# Patient Record
Sex: MALE | Race: WHITE | NOT HISPANIC OR LATINO | Employment: OTHER | URBAN - METROPOLITAN AREA
[De-identification: names, ages, dates, MRNs, and addresses within clinical notes are randomized per-mention and may not be internally consistent; named-entity substitution may affect disease eponyms.]

---

## 2020-09-09 ENCOUNTER — RECORDS - HEALTHEAST (OUTPATIENT)
Dept: LAB | Facility: CLINIC | Age: 85
End: 2020-09-09

## 2020-09-14 LAB
SARS-COV-2 BY NAA - HISTORICAL: NOT DETECTED
SARS-COV-2 SOURCE - HISTORICAL: NORMAL

## 2021-02-11 ENCOUNTER — RECORDS - HEALTHEAST (OUTPATIENT)
Dept: LAB | Facility: CLINIC | Age: 86
End: 2021-02-11

## 2021-02-11 LAB
SARS-COV-2 PCR COMMENT: NORMAL
SARS-COV-2 RNA SPEC QL NAA+PROBE: NEGATIVE
SARS-COV-2 VIRUS SPECIMEN SOURCE: NORMAL

## 2021-02-18 ENCOUNTER — RECORDS - HEALTHEAST (OUTPATIENT)
Dept: LAB | Facility: CLINIC | Age: 86
End: 2021-02-18

## 2021-03-11 ENCOUNTER — RECORDS - HEALTHEAST (OUTPATIENT)
Dept: LAB | Facility: CLINIC | Age: 86
End: 2021-03-11

## 2021-04-07 ENCOUNTER — RECORDS - HEALTHEAST (OUTPATIENT)
Dept: LAB | Facility: CLINIC | Age: 86
End: 2021-04-07

## 2021-04-13 ENCOUNTER — RECORDS - HEALTHEAST (OUTPATIENT)
Dept: LAB | Facility: CLINIC | Age: 86
End: 2021-04-13

## 2021-08-18 ENCOUNTER — LAB REQUISITION (OUTPATIENT)
Dept: LAB | Facility: CLINIC | Age: 86
End: 2021-08-18
Payer: MEDICARE

## 2021-08-18 DIAGNOSIS — U07.1 COVID-19: ICD-10-CM

## 2021-08-19 PROCEDURE — U0003 INFECTIOUS AGENT DETECTION BY NUCLEIC ACID (DNA OR RNA); SEVERE ACUTE RESPIRATORY SYNDROME CORONAVIRUS 2 (SARS-COV-2) (CORONAVIRUS DISEASE [COVID-19]), AMPLIFIED PROBE TECHNIQUE, MAKING USE OF HIGH THROUGHPUT TECHNOLOGIES AS DESCRIBED BY CMS-2020-01-R: HCPCS | Mod: ORL | Performed by: FAMILY MEDICINE

## 2021-08-20 LAB — SARS-COV-2 RNA RESP QL NAA+PROBE: NEGATIVE

## 2021-08-23 ENCOUNTER — LAB REQUISITION (OUTPATIENT)
Dept: LAB | Facility: CLINIC | Age: 86
End: 2021-08-23
Payer: MEDICARE

## 2021-08-23 DIAGNOSIS — U07.1 COVID-19: ICD-10-CM

## 2021-08-25 PROCEDURE — U0003 INFECTIOUS AGENT DETECTION BY NUCLEIC ACID (DNA OR RNA); SEVERE ACUTE RESPIRATORY SYNDROME CORONAVIRUS 2 (SARS-COV-2) (CORONAVIRUS DISEASE [COVID-19]), AMPLIFIED PROBE TECHNIQUE, MAKING USE OF HIGH THROUGHPUT TECHNOLOGIES AS DESCRIBED BY CMS-2020-01-R: HCPCS | Mod: ORL | Performed by: FAMILY MEDICINE

## 2021-08-26 LAB — SARS-COV-2 RNA RESP QL NAA+PROBE: NEGATIVE

## 2021-08-30 ENCOUNTER — LAB REQUISITION (OUTPATIENT)
Dept: LAB | Facility: CLINIC | Age: 86
End: 2021-08-30
Payer: MEDICARE

## 2021-08-30 DIAGNOSIS — U07.1 COVID-19: ICD-10-CM

## 2021-08-30 PROCEDURE — U0003 INFECTIOUS AGENT DETECTION BY NUCLEIC ACID (DNA OR RNA); SEVERE ACUTE RESPIRATORY SYNDROME CORONAVIRUS 2 (SARS-COV-2) (CORONAVIRUS DISEASE [COVID-19]), AMPLIFIED PROBE TECHNIQUE, MAKING USE OF HIGH THROUGHPUT TECHNOLOGIES AS DESCRIBED BY CMS-2020-01-R: HCPCS | Mod: ORL | Performed by: FAMILY MEDICINE

## 2021-08-31 ENCOUNTER — LAB REQUISITION (OUTPATIENT)
Dept: LAB | Facility: CLINIC | Age: 86
End: 2021-08-31
Payer: MEDICARE

## 2021-08-31 DIAGNOSIS — U07.1 COVID-19: ICD-10-CM

## 2021-08-31 LAB — SARS-COV-2 RNA RESP QL NAA+PROBE: NEGATIVE

## 2021-09-02 ENCOUNTER — LAB REQUISITION (OUTPATIENT)
Dept: LAB | Facility: CLINIC | Age: 86
End: 2021-09-02
Payer: MEDICARE

## 2021-09-02 DIAGNOSIS — U07.1 COVID-19: ICD-10-CM

## 2021-09-09 PROCEDURE — U0005 INFEC AGEN DETEC AMPLI PROBE: HCPCS | Mod: ORL | Performed by: FAMILY MEDICINE

## 2021-09-10 LAB — SARS-COV-2 RNA RESP QL NAA+PROBE: NEGATIVE

## 2022-01-05 PROCEDURE — U0005 INFEC AGEN DETEC AMPLI PROBE: HCPCS | Mod: ORL | Performed by: INTERNAL MEDICINE

## 2022-01-06 ENCOUNTER — LAB REQUISITION (OUTPATIENT)
Dept: LAB | Facility: CLINIC | Age: 87
End: 2022-01-06
Payer: MEDICARE

## 2022-01-06 DIAGNOSIS — U07.1 COVID-19: ICD-10-CM

## 2022-01-06 LAB — SARS-COV-2 RNA RESP QL NAA+PROBE: NEGATIVE

## 2022-01-07 ENCOUNTER — LAB REQUISITION (OUTPATIENT)
Dept: LAB | Facility: CLINIC | Age: 87
End: 2022-01-07
Payer: MEDICARE

## 2022-01-07 DIAGNOSIS — U07.1 COVID-19: ICD-10-CM

## 2022-01-11 PROCEDURE — U0003 INFECTIOUS AGENT DETECTION BY NUCLEIC ACID (DNA OR RNA); SEVERE ACUTE RESPIRATORY SYNDROME CORONAVIRUS 2 (SARS-COV-2) (CORONAVIRUS DISEASE [COVID-19]), AMPLIFIED PROBE TECHNIQUE, MAKING USE OF HIGH THROUGHPUT TECHNOLOGIES AS DESCRIBED BY CMS-2020-01-R: HCPCS | Mod: ORL | Performed by: INTERNAL MEDICINE

## 2022-01-14 ENCOUNTER — LAB REQUISITION (OUTPATIENT)
Dept: LAB | Facility: CLINIC | Age: 87
End: 2022-01-14
Payer: MEDICARE

## 2022-01-14 DIAGNOSIS — U07.1 COVID-19: ICD-10-CM

## 2022-01-14 LAB — SARS-COV-2 RNA RESP QL NAA+PROBE: NEGATIVE

## 2022-01-20 PROCEDURE — U0003 INFECTIOUS AGENT DETECTION BY NUCLEIC ACID (DNA OR RNA); SEVERE ACUTE RESPIRATORY SYNDROME CORONAVIRUS 2 (SARS-COV-2) (CORONAVIRUS DISEASE [COVID-19]), AMPLIFIED PROBE TECHNIQUE, MAKING USE OF HIGH THROUGHPUT TECHNOLOGIES AS DESCRIBED BY CMS-2020-01-R: HCPCS | Mod: ORL | Performed by: INTERNAL MEDICINE

## 2022-01-23 LAB — SARS-COV-2 RNA RESP QL NAA+PROBE: NEGATIVE

## 2022-01-25 ENCOUNTER — LAB REQUISITION (OUTPATIENT)
Dept: LAB | Facility: CLINIC | Age: 87
End: 2022-01-25
Payer: MEDICARE

## 2022-01-25 DIAGNOSIS — U07.1 COVID-19: ICD-10-CM

## 2022-01-28 PROCEDURE — U0005 INFEC AGEN DETEC AMPLI PROBE: HCPCS | Mod: ORL | Performed by: INTERNAL MEDICINE

## 2022-01-29 LAB — SARS-COV-2 RNA RESP QL NAA+PROBE: NEGATIVE

## 2022-02-02 ENCOUNTER — LAB REQUISITION (OUTPATIENT)
Dept: LAB | Facility: CLINIC | Age: 87
End: 2022-02-02
Payer: MEDICARE

## 2022-02-02 DIAGNOSIS — U07.1 COVID-19: ICD-10-CM

## 2022-02-11 PROCEDURE — U0003 INFECTIOUS AGENT DETECTION BY NUCLEIC ACID (DNA OR RNA); SEVERE ACUTE RESPIRATORY SYNDROME CORONAVIRUS 2 (SARS-COV-2) (CORONAVIRUS DISEASE [COVID-19]), AMPLIFIED PROBE TECHNIQUE, MAKING USE OF HIGH THROUGHPUT TECHNOLOGIES AS DESCRIBED BY CMS-2020-01-R: HCPCS | Mod: ORL | Performed by: INTERNAL MEDICINE

## 2022-02-12 LAB — SARS-COV-2 RNA RESP QL NAA+PROBE: NEGATIVE

## 2022-02-17 ENCOUNTER — LAB REQUISITION (OUTPATIENT)
Dept: LAB | Facility: CLINIC | Age: 87
End: 2022-02-17
Payer: MEDICARE

## 2022-02-17 DIAGNOSIS — U07.1 COVID-19: ICD-10-CM

## 2022-02-18 PROCEDURE — U0003 INFECTIOUS AGENT DETECTION BY NUCLEIC ACID (DNA OR RNA); SEVERE ACUTE RESPIRATORY SYNDROME CORONAVIRUS 2 (SARS-COV-2) (CORONAVIRUS DISEASE [COVID-19]), AMPLIFIED PROBE TECHNIQUE, MAKING USE OF HIGH THROUGHPUT TECHNOLOGIES AS DESCRIBED BY CMS-2020-01-R: HCPCS | Mod: ORL | Performed by: INTERNAL MEDICINE

## 2022-02-19 LAB — SARS-COV-2 RNA RESP QL NAA+PROBE: NEGATIVE

## 2022-02-24 ENCOUNTER — LAB REQUISITION (OUTPATIENT)
Dept: LAB | Facility: CLINIC | Age: 87
End: 2022-02-24
Payer: MEDICARE

## 2022-02-24 DIAGNOSIS — U07.1 COVID-19: ICD-10-CM

## 2022-02-25 PROCEDURE — U0003 INFECTIOUS AGENT DETECTION BY NUCLEIC ACID (DNA OR RNA); SEVERE ACUTE RESPIRATORY SYNDROME CORONAVIRUS 2 (SARS-COV-2) (CORONAVIRUS DISEASE [COVID-19]), AMPLIFIED PROBE TECHNIQUE, MAKING USE OF HIGH THROUGHPUT TECHNOLOGIES AS DESCRIBED BY CMS-2020-01-R: HCPCS | Mod: ORL | Performed by: INTERNAL MEDICINE

## 2022-02-26 LAB — SARS-COV-2 RNA RESP QL NAA+PROBE: NEGATIVE

## 2022-06-23 PROCEDURE — U0005 INFEC AGEN DETEC AMPLI PROBE: HCPCS | Mod: ORL | Performed by: INTERNAL MEDICINE

## 2022-06-24 ENCOUNTER — LAB REQUISITION (OUTPATIENT)
Dept: LAB | Facility: CLINIC | Age: 87
End: 2022-06-24
Payer: MEDICARE

## 2022-06-24 DIAGNOSIS — U07.1 COVID-19: ICD-10-CM

## 2022-06-24 LAB — SARS-COV-2 RNA RESP QL NAA+PROBE: NEGATIVE

## 2022-08-05 ENCOUNTER — LAB REQUISITION (OUTPATIENT)
Dept: LAB | Facility: CLINIC | Age: 87
End: 2022-08-05
Payer: MEDICARE

## 2022-08-05 DIAGNOSIS — U07.1 COVID-19: ICD-10-CM

## 2022-08-05 PROCEDURE — U0005 INFEC AGEN DETEC AMPLI PROBE: HCPCS | Mod: ORL | Performed by: INTERNAL MEDICINE

## 2022-08-06 LAB — SARS-COV-2 RNA RESP QL NAA+PROBE: NEGATIVE

## 2022-09-09 ENCOUNTER — DOCUMENTATION ONLY (OUTPATIENT)
Dept: OTHER | Facility: CLINIC | Age: 87
End: 2022-09-09

## 2023-01-01 ENCOUNTER — DOCUMENTATION ONLY (OUTPATIENT)
Dept: OTHER | Facility: CLINIC | Age: 88
End: 2023-01-01
Payer: MEDICARE

## 2023-01-01 ENCOUNTER — LAB REQUISITION (OUTPATIENT)
Dept: LAB | Facility: CLINIC | Age: 88
End: 2023-01-01
Payer: MEDICARE

## 2023-01-01 ENCOUNTER — ASSISTED LIVING VISIT (OUTPATIENT)
Dept: GERIATRICS | Facility: CLINIC | Age: 88
End: 2023-01-01
Payer: MEDICARE

## 2023-01-01 ENCOUNTER — TELEPHONE (OUTPATIENT)
Dept: GERIATRICS | Facility: CLINIC | Age: 88
End: 2023-01-01
Payer: MEDICARE

## 2023-01-01 ENCOUNTER — DOCUMENTATION ONLY (OUTPATIENT)
Dept: GERIATRICS | Facility: CLINIC | Age: 88
End: 2023-01-01
Payer: MEDICARE

## 2023-01-01 VITALS
HEIGHT: 65 IN | DIASTOLIC BLOOD PRESSURE: 67 MMHG | HEART RATE: 71 BPM | RESPIRATION RATE: 18 BRPM | WEIGHT: 134.2 LBS | OXYGEN SATURATION: 98 % | BODY MASS INDEX: 22.36 KG/M2 | SYSTOLIC BLOOD PRESSURE: 130 MMHG | TEMPERATURE: 98 F

## 2023-01-01 VITALS
HEART RATE: 63 BPM | OXYGEN SATURATION: 97 % | HEIGHT: 65 IN | SYSTOLIC BLOOD PRESSURE: 147 MMHG | DIASTOLIC BLOOD PRESSURE: 54 MMHG | RESPIRATION RATE: 16 BRPM | TEMPERATURE: 97.4 F | WEIGHT: 134.2 LBS | BODY MASS INDEX: 22.36 KG/M2

## 2023-01-01 VITALS
BODY MASS INDEX: 22.36 KG/M2 | WEIGHT: 134.2 LBS | HEART RATE: 77 BPM | HEIGHT: 65 IN | RESPIRATION RATE: 19 BRPM | DIASTOLIC BLOOD PRESSURE: 82 MMHG | SYSTOLIC BLOOD PRESSURE: 143 MMHG | TEMPERATURE: 97.3 F | OXYGEN SATURATION: 96 %

## 2023-01-01 DIAGNOSIS — F02.C3 SEVERE LATE ONSET ALZHEIMER'S DEMENTIA WITH MOOD DISTURBANCE (H): ICD-10-CM

## 2023-01-01 DIAGNOSIS — G30.1 SEVERE LATE ONSET ALZHEIMER'S DEMENTIA WITH MOOD DISTURBANCE (H): Primary | ICD-10-CM

## 2023-01-01 DIAGNOSIS — F02.80 ALZHEIMER'S DISEASE (H): ICD-10-CM

## 2023-01-01 DIAGNOSIS — G30.9 ALZHEIMER'S DISEASE (H): ICD-10-CM

## 2023-01-01 DIAGNOSIS — F02.C3 SEVERE LATE ONSET ALZHEIMER'S DEMENTIA WITH MOOD DISTURBANCE (H): Primary | ICD-10-CM

## 2023-01-01 DIAGNOSIS — R19.7 DIARRHEA, UNSPECIFIED TYPE: ICD-10-CM

## 2023-01-01 DIAGNOSIS — M47.812 SPONDYLOSIS WITHOUT MYELOPATHY OR RADICULOPATHY, CERVICAL REGION: ICD-10-CM

## 2023-01-01 DIAGNOSIS — E40 KWASHIORKOR (H): ICD-10-CM

## 2023-01-01 DIAGNOSIS — E11.9 TYPE 2 DIABETES MELLITUS WITHOUT COMPLICATION, WITHOUT LONG-TERM CURRENT USE OF INSULIN (H): ICD-10-CM

## 2023-01-01 DIAGNOSIS — E44.0 MODERATE PROTEIN-CALORIE MALNUTRITION (H): Primary | ICD-10-CM

## 2023-01-01 DIAGNOSIS — I10 BENIGN HYPERTENSION: ICD-10-CM

## 2023-01-01 DIAGNOSIS — G89.29 CHRONIC BILATERAL LOW BACK PAIN WITHOUT SCIATICA: ICD-10-CM

## 2023-01-01 DIAGNOSIS — R29.6 RECURRENT FALLS: ICD-10-CM

## 2023-01-01 DIAGNOSIS — Z74.09 IMPAIRED MOBILITY: ICD-10-CM

## 2023-01-01 DIAGNOSIS — M54.50 CHRONIC BILATERAL LOW BACK PAIN WITHOUT SCIATICA: ICD-10-CM

## 2023-01-01 DIAGNOSIS — I10 ESSENTIAL (PRIMARY) HYPERTENSION: ICD-10-CM

## 2023-01-01 DIAGNOSIS — K52.9 NONINFECTIVE GASTROENTERITIS AND COLITIS, UNSPECIFIED: ICD-10-CM

## 2023-01-01 DIAGNOSIS — E44.0 MODERATE PROTEIN-CALORIE MALNUTRITION (H): ICD-10-CM

## 2023-01-01 DIAGNOSIS — G30.1 SEVERE LATE ONSET ALZHEIMER'S DEMENTIA WITH MOOD DISTURBANCE (H): ICD-10-CM

## 2023-01-01 DIAGNOSIS — Z51.5 HOSPICE CARE PATIENT: ICD-10-CM

## 2023-01-01 LAB
ALBUMIN SERPL BCG-MCNC: 4.1 G/DL (ref 3.5–5.2)
ALP SERPL-CCNC: 84 U/L (ref 40–129)
ALT SERPL W P-5'-P-CCNC: 14 U/L (ref 0–70)
ANION GAP SERPL CALCULATED.3IONS-SCNC: 17 MMOL/L (ref 7–15)
AST SERPL W P-5'-P-CCNC: 36 U/L (ref 0–45)
BASO+EOS+MONOS # BLD AUTO: ABNORMAL 10*3/UL
BASO+EOS+MONOS NFR BLD AUTO: ABNORMAL %
BASOPHILS # BLD AUTO: 0.1 10E3/UL (ref 0–0.2)
BASOPHILS NFR BLD AUTO: 1 %
BILIRUB SERPL-MCNC: 0.4 MG/DL
BUN SERPL-MCNC: 14.9 MG/DL (ref 8–23)
C DIFF TOX B STL QL: NEGATIVE
CALCIUM SERPL-MCNC: 9.7 MG/DL (ref 8.8–10.2)
CHLORIDE SERPL-SCNC: 94 MMOL/L (ref 98–107)
CREAT SERPL-MCNC: 0.99 MG/DL (ref 0.67–1.17)
DEPRECATED HCO3 PLAS-SCNC: 24 MMOL/L (ref 22–29)
EGFRCR SERPLBLD CKD-EPI 2021: 75 ML/MIN/1.73M2
EOSINOPHIL # BLD AUTO: 0.2 10E3/UL (ref 0–0.7)
EOSINOPHIL NFR BLD AUTO: 3 %
ERYTHROCYTE [DISTWIDTH] IN BLOOD BY AUTOMATED COUNT: 11.6 % (ref 10–15)
GLUCOSE SERPL-MCNC: 228 MG/DL (ref 70–99)
HBA1C MFR BLD: 6.9 %
HCT VFR BLD AUTO: 39.4 % (ref 40–53)
HGB BLD-MCNC: 12.8 G/DL (ref 13.3–17.7)
IMM GRANULOCYTES # BLD: 0 10E3/UL
IMM GRANULOCYTES NFR BLD: 0 %
LYMPHOCYTES # BLD AUTO: 1.8 10E3/UL (ref 0.8–5.3)
LYMPHOCYTES NFR BLD AUTO: 25 %
MCH RBC QN AUTO: 29.6 PG (ref 26.5–33)
MCHC RBC AUTO-ENTMCNC: 32.5 G/DL (ref 31.5–36.5)
MCV RBC AUTO: 91 FL (ref 78–100)
MONOCYTES # BLD AUTO: 0.7 10E3/UL (ref 0–1.3)
MONOCYTES NFR BLD AUTO: 9 %
NEUTROPHILS # BLD AUTO: 4.5 10E3/UL (ref 1.6–8.3)
NEUTROPHILS NFR BLD AUTO: 62 %
NRBC # BLD AUTO: 0 10E3/UL
NRBC BLD AUTO-RTO: 0 /100
PLATELET # BLD AUTO: 435 10E3/UL (ref 150–450)
POTASSIUM SERPL-SCNC: 4 MMOL/L (ref 3.4–5.3)
PROT SERPL-MCNC: 6.7 G/DL (ref 6.4–8.3)
RBC # BLD AUTO: 4.32 10E6/UL (ref 4.4–5.9)
SODIUM SERPL-SCNC: 135 MMOL/L (ref 135–145)
TSH SERPL DL<=0.005 MIU/L-ACNC: 2.01 UIU/ML (ref 0.3–4.2)
VIT D+METAB SERPL-MCNC: 32 NG/ML (ref 20–50)
WBC # BLD AUTO: 7.2 10E3/UL (ref 4–11)

## 2023-01-01 PROCEDURE — 99344 HOME/RES VST NEW MOD MDM 60: CPT | Performed by: NURSE PRACTITIONER

## 2023-01-01 PROCEDURE — 83036 HEMOGLOBIN GLYCOSYLATED A1C: CPT | Mod: ORL | Performed by: NURSE PRACTITIONER

## 2023-01-01 PROCEDURE — P9603 ONE-WAY ALLOW PRORATED MILES: HCPCS | Mod: ORL | Performed by: NURSE PRACTITIONER

## 2023-01-01 PROCEDURE — 36415 COLL VENOUS BLD VENIPUNCTURE: CPT | Mod: ORL | Performed by: NURSE PRACTITIONER

## 2023-01-01 PROCEDURE — 84443 ASSAY THYROID STIM HORMONE: CPT | Mod: ORL | Performed by: NURSE PRACTITIONER

## 2023-01-01 PROCEDURE — 85025 COMPLETE CBC W/AUTO DIFF WBC: CPT | Mod: ORL | Performed by: NURSE PRACTITIONER

## 2023-01-01 PROCEDURE — 99348 HOME/RES VST EST LOW MDM 30: CPT | Mod: GV | Performed by: INTERNAL MEDICINE

## 2023-01-01 PROCEDURE — 80053 COMPREHEN METABOLIC PANEL: CPT | Mod: ORL | Performed by: NURSE PRACTITIONER

## 2023-01-01 PROCEDURE — 99349 HOME/RES VST EST MOD MDM 40: CPT | Mod: GV | Performed by: NURSE PRACTITIONER

## 2023-01-01 PROCEDURE — 87493 C DIFF AMPLIFIED PROBE: CPT | Mod: ORL | Performed by: NURSE PRACTITIONER

## 2023-01-01 PROCEDURE — 82306 VITAMIN D 25 HYDROXY: CPT | Mod: ORL | Performed by: NURSE PRACTITIONER

## 2023-01-01 PROCEDURE — 99349 HOME/RES VST EST MOD MDM 40: CPT | Performed by: NURSE PRACTITIONER

## 2023-01-01 RX ORDER — QUETIAPINE FUMARATE 25 MG/1
25 TABLET, FILM COATED ORAL EVERY 12 HOURS PRN
COMMUNITY

## 2023-01-01 RX ORDER — METFORMIN HCL 500 MG
1000 TABLET, EXTENDED RELEASE 24 HR ORAL 2 TIMES DAILY
COMMUNITY
Start: 2022-10-12 | End: 2023-01-01

## 2023-01-01 RX ORDER — HALOPERIDOL 0.5 MG/1
0.5 TABLET ORAL EVERY 4 HOURS PRN
COMMUNITY

## 2023-01-01 RX ORDER — ACETAMINOPHEN 325 MG/1
650 TABLET ORAL EVERY 4 HOURS PRN
COMMUNITY

## 2023-01-01 RX ORDER — LOPERAMIDE HCL 2 MG
2 CAPSULE ORAL DAILY PRN
COMMUNITY
Start: 2023-01-01 | End: 2023-01-01

## 2023-01-01 RX ORDER — HYDROMORPHONE HYDROCHLORIDE 2 MG/1
1 TABLET ORAL
COMMUNITY

## 2023-01-01 RX ORDER — BISACODYL 10 MG
10 SUPPOSITORY, RECTAL RECTAL DAILY PRN
COMMUNITY

## 2023-01-01 RX ORDER — CHLORTHALIDONE 25 MG/1
25 TABLET ORAL DAILY
COMMUNITY
Start: 2023-01-01 | End: 2023-01-01

## 2023-01-01 RX ORDER — LORAZEPAM 0.5 MG/1
0.5 TABLET ORAL EVERY 6 HOURS
COMMUNITY

## 2023-01-01 RX ORDER — ACETAMINOPHEN 325 MG/1
650 TABLET ORAL 4 TIMES DAILY PRN
COMMUNITY
Start: 2022-10-11 | End: 2023-01-01

## 2023-01-01 RX ORDER — LOPERAMIDE HCL 2 MG
2 CAPSULE ORAL 4 TIMES DAILY PRN
Qty: 60 CAPSULE | Refills: 11 | Status: SHIPPED | OUTPATIENT
Start: 2023-01-01 | End: 2023-01-01 | Stop reason: DRUGHIGH

## 2023-01-01 RX ORDER — LOPERAMIDE HYDROCHLORIDE 2 MG/1
2 TABLET ORAL 4 TIMES DAILY PRN
Qty: 30 TABLET | Refills: 11 | Status: SHIPPED | OUTPATIENT
Start: 2023-01-01

## 2023-01-01 RX ORDER — NYSTATIN 100000 U/G
CREAM TOPICAL 2 TIMES DAILY
COMMUNITY

## 2023-01-01 RX ORDER — DONEPEZIL HYDROCHLORIDE 10 MG/1
10 TABLET, FILM COATED ORAL AT BEDTIME
COMMUNITY
Start: 2022-10-11 | End: 2023-01-01

## 2023-01-01 RX ORDER — ONDANSETRON 4 MG/1
4 TABLET, ORALLY DISINTEGRATING ORAL EVERY 6 HOURS PRN
COMMUNITY

## 2023-10-10 PROBLEM — E11.9 DIABETES MELLITUS (H): Status: ACTIVE | Noted: 2023-01-01

## 2023-10-10 PROBLEM — H90.5 SENSORINEURAL HEARING LOSS (SNHL): Status: ACTIVE | Noted: 2023-01-01

## 2023-10-10 PROBLEM — R10.84 GENERALIZED ABDOMINAL PAIN: Status: ACTIVE | Noted: 2023-01-01

## 2023-10-10 PROBLEM — G30.9 ALZHEIMER'S DISEASE (H): Status: ACTIVE | Noted: 2023-01-01

## 2023-10-10 PROBLEM — I10 BENIGN HYPERTENSION: Status: ACTIVE | Noted: 2023-01-01

## 2023-10-10 PROBLEM — M54.9 CHRONIC BACK PAIN: Status: ACTIVE | Noted: 2023-01-01

## 2023-10-10 PROBLEM — E11.9 TYPE 2 DIABETES MELLITUS WITHOUT COMPLICATIONS (H): Status: ACTIVE | Noted: 2023-01-01

## 2023-10-10 PROBLEM — M47.812 SPONDYLOSIS WITHOUT MYELOPATHY OR RADICULOPATHY, CERVICAL REGION: Status: ACTIVE | Noted: 2023-01-01

## 2023-10-10 PROBLEM — F02.80 ALZHEIMER'S DISEASE (H): Status: ACTIVE | Noted: 2023-01-01

## 2023-10-10 PROBLEM — C61 PRIMARY MALIGNANT NEOPLASM OF PROSTATE (H): Status: ACTIVE | Noted: 2023-01-01

## 2023-10-10 PROBLEM — K21.00 GASTRO-ESOPHAGEAL REFLUX DISEASE WITH ESOPHAGITIS: Status: ACTIVE | Noted: 2023-01-01

## 2023-10-10 PROBLEM — E78.5 HYPERLIPIDEMIA: Status: ACTIVE | Noted: 2023-01-01

## 2023-10-10 PROBLEM — R62.7 ADULT FAILURE TO THRIVE: Status: ACTIVE | Noted: 2023-01-01

## 2023-10-10 PROBLEM — Z73.6 LIMITATION OF ACTIVITIES DUE TO DISABILITY: Status: ACTIVE | Noted: 2023-01-01

## 2023-10-10 PROBLEM — G89.29 CHRONIC BACK PAIN: Status: ACTIVE | Noted: 2023-01-01

## 2023-10-10 PROBLEM — R26.9 UNSPECIFIED ABNORMALITIES OF GAIT AND MOBILITY: Status: ACTIVE | Noted: 2023-01-01

## 2023-10-10 PROBLEM — M47.819 SPONDYLOSIS WITHOUT MYELOPATHY: Status: ACTIVE | Noted: 2023-01-01

## 2023-10-10 PROBLEM — Z74.09 IMPAIRED MOBILITY: Status: ACTIVE | Noted: 2023-01-01

## 2023-10-10 NOTE — NURSING NOTE
[facility] GERIATRICS    PRIMARY CARE PROVIDER AND CLINIC:  ROM Singer CNP, [address] [facility] [address]  Chief Complaint   Patient presents with    [facility] Medical Record Number:  [id number]  Place of Service where encounter took place:  [facility]) [[id number]]    Admitted for cognitive impairment and failure to thrive.     HPI:    He has been living at [facility] since October 2019. Limited due to dementia diagnosis. Patient found resting in bed during visit. He provided one or two word answers to questions. He denies abdominal pain, nausea, vomiting, bowel or urinary concerns, or loss of appetite. He endorses eating majority of meals in his room. He reports he's not using a walker or wheelchair to ambulate and walking independently. He denies pain after fall.     Per RN staff: Patient making inappropriate comments towards staff at times. Patient not allowing staff to place nystatin under foreskin. They do see erythema there. He had a fall on Santi 10/8 when standing up from a seating position without injury.     Unable to confirm social, family, and surgical history d/t patient's memory impairment.     CODE STATUS/ADVANCE DIRECTIVES DISCUSSION:  DNR / DNI    ALLERGIES:   Allergies   Allergen Reactions    Sulfamethoxazole-Trimethoprim Other (See Comments)    Lisinopril Cough      PAST MEDICAL HISTORY:   Past Medical History:   Diagnosis Date    Alzheimer's disease (H) 10/10/2023    Benign hypertension 10/10/2023    Diabetes mellitus (H) 10/10/2023    Feb 12, 2002 Entered By: DA RICHTER Comment: diagnosis 2/1/2002    Gastro-esophageal reflux disease with esophagitis 10/10/2023    Hyperlipidemia 10/10/2023    Primary malignant neoplasm of prostate (H) 10/10/2023    Spondylosis without myelopathy or radiculopathy, cervical region 10/10/2023      PAST SURGICAL HISTORY:   has no past surgical history on file.  FAMILY HISTORY: family history is  "not on file.  SOCIAL HISTORY:   reports that he has quit smoking. His smoking use included cigarettes. He has never used smokeless tobacco. He reports that he does not currently use alcohol. He reports that he does not currently use drugs.    Patient's living condition: lives in an assisted living facility memory care       Current Outpatient Medications   Medication Sig    acetaminophen (TYLENOL) 325 MG tablet Take 650 mg by mouth 4 times daily as needed    chlorthalidone (HYGROTON) 25 MG tablet Take 25 mg by mouth daily    donepezil (ARICEPT) 10 MG tablet Take 10 mg by mouth at bedtime    loperamide (IMODIUM) 2 MG capsule Take 2 mg by mouth daily as needed    metFORMIN (GLUCOPHAGE XR) 500 MG 24 hr tablet Take 1,000 mg by mouth 2 times daily    Multiple Vitamin (MULTIVITAMIN PO) Take 1 tablet by mouth daily    nystatin (MYCOSTATIN) 597343 UNIT/GM external cream Apply topically 2 times daily    ondansetron (ZOFRAN ODT) 4 MG ODT tab Take 4 mg by mouth every 6 hours as needed for nausea    QUEtiapine (SEROQUEL) 25 MG tablet Take 25 mg by mouth every 12 hours as needed     No current facility-administered medications for this visit.       ROS:  Limited secondary to cognitive impairment but negative unless noted above.     Vitals:  /67   Pulse 71   Temp 98  F (36.7  C)   Resp 18   Ht 1.651 m (5' 5\")   Wt 60.9 kg (134 lb 3.2 oz)   SpO2 98%   BMI 22.33 kg/m    Exam:  GENERAL APPEARANCE:  in no distress, thin, cooperative, drowsy  ENT:  Mouth and posterior oropharynx normal, moist mucous membranes, [place], poor dentition  EYES:  EOM, conjunctivae, lids, pupils and irises normal  RESP:  respiratory effort and palpation of chest normal, lungs clear to auscultation   CV:  Palpation and auscultation of heart done , regular rate and rhythm, no murmur, rub, or gallop, no edema  ABDOMEN:  normal bowel sounds, soft, nontender, no hepatosplenomegaly or other masses  M/S:   Gait and station abnormal    SKIN:  Inspection of " skin and subcutaneous tissue baseline, generalized lentigines noted on exposed skin  NEURO:   Cranial nerves 2-12 are normal tested and grossly at patient's baseline  PSYCH:  oriented to person, insight and judgement impaired, memory impaired , apathetic    Lab/Diagnostic data:  Labs done in SNF are in [place] EPIC. Please refer to them using EPIC/Care Everywhere. and Most Recent 3 CBC's:No lab results found.  Most Recent 3 BMP's:No lab results found.    ASSESSMENT/PLAN:  # Alzheimer's disease  First diagnosed in April 2018. Intermittent behavioral concerns per staff. Not observed during visit today  - Continue monitoring behavioral symptoms  - Continue Aricept daily and quetiapine as needed     # Type 2 Diabetes Mellitus   # GERD  # Abdominal pain  # Nausea  Patient denies concerns today.  - Metformin 1,000 mg BID  - Zofran PRN for nausea    # Essential hypertension  # Hyperlipidemia  BP controlled.   - On chlorthalidone daily    # Chronic back pain  # Spondylosis w/o myelopathy or radiculopathy, cervical region  Patient denies pain during visit today.  - Tylenol QID as needed    # Primary malignant neoplasm of prostate  Per [facility] Continuity of Care faxed document. Not discussed today.    Orders:  - Please fax POLST to [facility]      Patient seen and discussed with Juana Christensen CNP    Electronically signed by:  Jessica Gonzalez, Nurse Practitioner Student  [facility]

## 2023-10-10 NOTE — LETTER
10/10/2023        RE: Moustapha Gonzales  C/o Katelyn Covarrubias  [address] [place] [address]        [facility] GERIATRICS    PRIMARY CARE PROVIDER AND CLINIC:  ROM Singer CNP, [address] [facility] [place] [address]  Chief Complaint   Patient presents with     [facility] Medical Record Number:  [id number]  Place of Service where encounter took place:  [facility])([facility]) [[id number]]    Moustapha Gonzales  is a 87 year old  (1935), living in above facility since 12/30/19 and now choosing to change PCPs to [facility].     HPI:      PMH: dementia, hx prostate cancer, HTN, diabetes    Limited records given no access to [facility] records at this time. Per RN notes, last hospitalization at [facility] in 04/2023 due to dehydration.     Resides in memory care unit at [facility]. Per RN notes, noted to have physical and cognitive decline. Reports sleeping more throughout the day. Ongoing weight loss, approx 10lb weight loss over the past 2 years. Recurrent falls, last fall on 10/8/23 without injuries. A few months ago, was noted to be making inappropriate comments to female staff; behaviors improved with addition of seroquel PRN and assist x 2 staff members w/cares.      Today's concerns:    During exam, patient seen resting in bed in [facility] apartment. HPI limited due to dementia; answers questions with 1-2 word responses. Reports doing well, denies pain. Endorses fatigue, states he could fall asleep during visit. Admits to good appetite. Sleeping well at night. Denies constipation, diarrhea. Denies chest pain, SOB, headache, syncope.      CODE STATUS/ADVANCE DIRECTIVES DISCUSSION:  DNR / DNI  ALLERGIES:   Allergies   Allergen Reactions     Sulfamethoxazole-Trimethoprim Hives     Lisinopril Cough      PAST MEDICAL HISTORY:   Past Medical History:   Diagnosis Date     Alzheimer's disease (H) 10/10/2023     Benign hypertension 10/10/2023     Diabetes mellitus (H) 10/10/2023    Feb  "12, 2002 Entered By: DA RICHTER Comment: diagnosis 2/1/2002     Gastro-esophageal reflux disease with esophagitis 10/10/2023     Hyperlipidemia 10/10/2023     Primary malignant neoplasm of prostate (H) 10/10/2023     Spondylosis without myelopathy or radiculopathy, cervical region 10/10/2023      PAST SURGICAL HISTORY:   has no past surgical history on file.  FAMILY HISTORY: family history is not on file.  SOCIAL HISTORY:   reports that he has quit smoking. His smoking use included cigarettes. He has never used smokeless tobacco. He reports that he does not currently use alcohol. He reports that he does not currently use drugs.  Patient's living condition: lives in an assisted living facility    Post Discharge Medication Reconciliation Status:   MED REC REQUIRED  Post Medication Reconciliation Status: patient was not discharged from an inpatient facility or TCU     Current Outpatient Medications   Medication Sig     acetaminophen (TYLENOL) 325 MG tablet Take 650 mg by mouth 4 times daily as needed     chlorthalidone (HYGROTON) 25 MG tablet Take 25 mg by mouth daily     donepezil (ARICEPT) 10 MG tablet Take 10 mg by mouth at bedtime     loperamide (IMODIUM) 2 MG capsule Take 2 mg by mouth daily as needed     metFORMIN (GLUCOPHAGE XR) 500 MG 24 hr tablet Take 1,000 mg by mouth 2 times daily     Multiple Vitamin (MULTIVITAMIN PO) Take 1 tablet by mouth daily     nystatin (MYCOSTATIN) 614515 UNIT/GM external cream Apply topically 2 times daily     ondansetron (ZOFRAN ODT) 4 MG ODT tab Take 4 mg by mouth every 6 hours as needed for nausea     QUEtiapine (SEROQUEL) 25 MG tablet Take 25 mg by mouth every 12 hours as needed     No current facility-administered medications for this visit.       ROS:  Unobtainable secondary to cognitive impairment.     Vitals:  /67   Pulse 71   Temp 98  F (36.7  C)   Resp 18   Ht 1.651 m (5' 5\")   Wt 60.9 kg (134 lb 3.2 oz)   SpO2 98%   BMI 22.33 kg/m    Exam:  GENERAL " APPEARANCE:  in no distress, thin, cooperative  ENT:  Mouth and posterior oropharynx normal, moist mucous membranes, [place]  EYES:  EOM, conjunctivae, lids, pupils and irises normal, PERRL  RESP:  respiratory effort and palpation of chest normal, lungs clear to auscultation , no respiratory distress, diminished breath sounds at bases  CV:  Palpation and auscultation of heart done , regular rate and rhythm, no murmur, rub, or gallop, no edema  ABDOMEN:  normal bowel sounds, soft, nontender, no guarding or rebound  M/S:   Gait and station abnormal, resting in bed.   SKIN:  Inspection of skin and subcutaneous tissue baseline, Palpation of skin and subcutaneous tissue baseline  NEURO:   Cranial nerves 2-12 are normal tested and grossly at patient's baseline, Examination of sensation by touch normal  PSYCH:  oriented to self, memory impaired             Lab/Diagnostic data:  Unable to review patient's records. Requested records from [facility].      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 Diagnosed with Alzheimer's dementia in 04/2018. Impaired mobility, unable to assess gait today. Hx recurrent falls, last fall on 10/8/23 without injuries.  Plan:   - Check CBC, CMP, TSH, A1c, vitamin D level on 10/18/23 dx malnutrition, HTN, dementia, diabetes  - Reviewed POLST, noted DNR/DNI   - Continue seroquel 25mg BID PRN  - Consider home care referral vs hospice care. Left voicemail for Yaneli (daughter) to review patient status and goals of care.    (E44.0) Moderate protein-calorie malnutrition (H24)  Comment: Chronic malnutrition. Body mass index is 22.33 kg/m . Last hospitalization in 04/2023 d/t dehydration.  Plan:   - Discontinue chlorthalidone  - Monitor weights    (E11.9) Type 2 diabetes  mellitus without complication, without long-term current use of insulin (H)  Comment: Chronic  Plan:   - Check A1c on 10/18  - Continue metformin, consider dose reduction    (I10) Benign hypertension  Comment: Controlled  Plan:   - Discontinue chlorthalidone  - Recheck BP/HR in 1 week    (M47.812) Spondylosis without myelopathy or radiculopathy, cervical region  (M54.50,  G89.29) Chronic bilateral low back pain without sciatica  Comment: Chronic  Plan:   - Continue tylenol PRN      Orders:  - Please fax POLST to [facility]  - Discontinue chlorthalidone  - Recheck BP/HR in 1 week  - Check CBC, CMP, TSH, A1c, vitamin D level on 10/18/23 dx malnutrition, HTN, dementia, diabetes      Electronically signed by:  ROM Singer CNP                     Sincerely,        ROM Singer CNP

## 2023-10-10 NOTE — PROGRESS NOTES
[facility] GERIATRICS    PRIMARY CARE PROVIDER AND CLINIC:  ROM Singer CNP, [address] [facility] [address]  Chief Complaint   Patient presents with    [facility] Medical Record Number:  [id number]  Place of Service where encounter took place:  [facility])([facility]GS) [[id number]]    Moustapha Gonzales  is a 87 year old  (1935), living in above facility since 12/30/19 and now choosing to change PCPs to [facility].     HPI:      PMH: dementia, hx prostate cancer, HTN, diabetes    Limited records given no access to [facility] records at this time. Per RN notes, last hospitalization at [facility] in 04/2023 due to dehydration.     Resides in memory care unit at [facility]. Per RN notes, noted to have physical and cognitive decline. Reports sleeping more throughout the day. Ongoing weight loss, approx 10lb weight loss over the past 2 years. Recurrent falls, last fall on 10/8/23 without injuries. A few months ago, was noted to be making inappropriate comments to female staff; behaviors improved with addition of seroquel PRN and assist x 2 staff members w/cares.      Today's concerns:    During exam, patient seen resting in bed in [facility] apartment. HPI limited due to dementia; answers questions with 1-2 word responses. Reports doing well, denies pain. Endorses fatigue, states he could fall asleep during visit. Admits to good appetite. Sleeping well at night. Denies constipation, diarrhea. Denies chest pain, SOB, headache, syncope.      CODE STATUS/ADVANCE DIRECTIVES DISCUSSION:  DNR / DNI  ALLERGIES:   Allergies   Allergen Reactions    Sulfamethoxazole-Trimethoprim Hives    Lisinopril Cough      PAST MEDICAL HISTORY:   Past Medical History:   Diagnosis Date    Alzheimer's disease (H) 10/10/2023    Benign hypertension 10/10/2023    Diabetes mellitus (H) 10/10/2023    Feb 12, 2002 Entered By: DA RICHTER Comment: diagnosis 2/1/2002    Gastro-esophageal reflux disease with  "esophagitis 10/10/2023    Hyperlipidemia 10/10/2023    Primary malignant neoplasm of prostate (H) 10/10/2023    Spondylosis without myelopathy or radiculopathy, cervical region 10/10/2023      PAST SURGICAL HISTORY:   has no past surgical history on file.  FAMILY HISTORY: family history is not on file.  SOCIAL HISTORY:   reports that he has quit smoking. His smoking use included cigarettes. He has never used smokeless tobacco. He reports that he does not currently use alcohol. He reports that he does not currently use drugs.  Patient's living condition: lives in an assisted living facility    Post Discharge Medication Reconciliation Status:   MED REC REQUIRED  Post Medication Reconciliation Status: patient was not discharged from an inpatient facility or TCU     Current Outpatient Medications   Medication Sig    acetaminophen (TYLENOL) 325 MG tablet Take 650 mg by mouth 4 times daily as needed    chlorthalidone (HYGROTON) 25 MG tablet Take 25 mg by mouth daily    donepezil (ARICEPT) 10 MG tablet Take 10 mg by mouth at bedtime    loperamide (IMODIUM) 2 MG capsule Take 2 mg by mouth daily as needed    metFORMIN (GLUCOPHAGE XR) 500 MG 24 hr tablet Take 1,000 mg by mouth 2 times daily    Multiple Vitamin (MULTIVITAMIN PO) Take 1 tablet by mouth daily    nystatin (MYCOSTATIN) 780804 UNIT/GM external cream Apply topically 2 times daily    ondansetron (ZOFRAN ODT) 4 MG ODT tab Take 4 mg by mouth every 6 hours as needed for nausea    QUEtiapine (SEROQUEL) 25 MG tablet Take 25 mg by mouth every 12 hours as needed     No current facility-administered medications for this visit.       ROS:  Unobtainable secondary to cognitive impairment.     Vitals:  /67   Pulse 71   Temp 98  F (36.7  C)   Resp 18   Ht 1.651 m (5' 5\")   Wt 60.9 kg (134 lb 3.2 oz)   SpO2 98%   BMI 22.33 kg/m    Exam:  GENERAL APPEARANCE:  in no distress, thin, cooperative  ENT:  Mouth and posterior oropharynx normal, moist mucous membranes, " [place]  EYES:  EOM, conjunctivae, lids, pupils and irises normal, PERRL  RESP:  respiratory effort and palpation of chest normal, lungs clear to auscultation , no respiratory distress, diminished breath sounds at bases  CV:  Palpation and auscultation of heart done , regular rate and rhythm, no murmur, rub, or gallop, no edema  ABDOMEN:  normal bowel sounds, soft, nontender, no guarding or rebound  M/S:   Gait and station abnormal, resting in bed.   SKIN:  Inspection of skin and subcutaneous tissue baseline, Palpation of skin and subcutaneous tissue baseline  NEURO:   Cranial nerves 2-12 are normal tested and grossly at patient's baseline, Examination of sensation by touch normal  PSYCH:  oriented to self, memory impaired             Lab/Diagnostic data:  Unable to review patient's records. Requested records from [facility].      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 Diagnosed with Alzheimer's dementia in 04/2018. Impaired mobility, unable to assess gait today. Hx recurrent falls, last fall on 10/8/23 without injuries.  Plan:   - Check CBC, CMP, TSH, A1c, vitamin D level on 10/18/23 dx malnutrition, HTN, dementia, diabetes  - Reviewed POLST, noted DNR/DNI   - Continue seroquel 25mg BID PRN  - Consider home care referral vs hospice care. Left voicemail for Yaneli (daughter) to review patient status and goals of care.    (E44.0) Moderate protein-calorie malnutrition (H24)  Comment: Chronic malnutrition. Body mass index is 22.33 kg/m . Last hospitalization in 04/2023 d/t dehydration.  Plan:   - Discontinue chlorthalidone  - Monitor weights    (E11.9) Type 2 diabetes mellitus without complication, without long-term current use of insulin (H)  Comment: Chronic  Plan:   - Check A1c on  10/18  - Continue metformin, consider dose reduction    (I10) Benign hypertension  Comment: Controlled  Plan:   - Discontinue chlorthalidone  - Recheck BP/HR in 1 week    (M47.812) Spondylosis without myelopathy or radiculopathy, cervical region  (M54.50,  G89.29) Chronic bilateral low back pain without sciatica  Comment: Chronic  Plan:   - Continue tylenol PRN      Orders:  - Please fax POLST to [facility]  - Discontinue chlorthalidone  - Recheck BP/HR in 1 week  - Check CBC, CMP, TSH, A1c, vitamin D level on 10/18/23 dx malnutrition, HTN, dementia, diabetes      Electronically signed by:  ROM Singer CNP

## 2023-10-13 NOTE — PROGRESS NOTES
[facility] Geriatrics   2023     Name: Moustapha Gonzales   : 1935       Orders:  - Please fax POLST to [facility]  - Discontinue chlorthalidone  - Recheck BP/HR in 1 week  - Check CBC, CMP, TSH, A1c, vitamin D level on 10/18/23 dx malnutrition, HTN, dementia, diabetes       Electronically signed by  ROM Singer CNP on 10/13/2023 at 1:12 PM

## 2023-10-13 NOTE — PROGRESS NOTES
[facility] Geriatrics   2023     Name: Moustapha Gonzales   : 1935     Background:  Reviewed patient status and treatment plan with Yaneli (daughter)      Orders:  - Referral to [facility] for informational meeting        Electronically signed by   ROM Singer CNP on 10/12/2023 at 8:48 PM

## 2023-10-19 NOTE — TELEPHONE ENCOUNTER
[facility] Geriatrics Lab Note     Provider: ROM Minor CNP   Facility: [facility]  Facility Type:  AL    Allergies   Allergen Reactions    Sulfamethoxazole-Trimethoprim Hives    Lisinopril Cough       Labs Reviewed by provider: CBC, CMP, TSH, VIT D, A1c     Verbal Order/Direction given by Provider:   1) Monitor BM x 1 week dx diarrhea   2) Collect stool sample to rule out C diff dx diarrhea   3) Increase imodium to 2mg QID PRN dx diarrhea     Provider giving Order:  ROM Minor CNP     Verbal Order given to: Geo Mann RN

## 2023-10-23 NOTE — PROGRESS NOTES
"[facility] GERIATRICS    Chief Complaint   Patient presents with    RECHECK     HPI:  Moustapha Gonzales is a 87 year old  (1935), who is being seen today for an episodic care visit at: [facility])(FGS) [[id number]].       Today's concern is:     RN reports patient's stools have been more formed compared to multiple episodes of diarrhea over the past 2 weeks. Recent stool sample collected negative for C diff. Afebrile. States Yaneli (daughter) and family meeting with [facility] hospice team today for informational meeting.    During exam, patient seen resting in bed. HPI limited due to dementia, somnolence. No pain associated behaviors. Per RN staff, patient had an active morning and ate breakfast/lunch prior to taking a nap following lunch. Reports it is not uncommon for patient to take an afternoon nap.      Allergies, and PMH/PSH reviewed in [facility] today.    REVIEW OF SYSTEMS:  Unobtainable secondary to cognitive impairment.       Objective:   BP (!) 147/54   Pulse 63   Temp 97.4  F (36.3  C)   Resp 16   Ht 1.651 m (5' 5\")   Wt 60.9 kg (134 lb 3.2 oz)   SpO2 97%   BMI 22.33 kg/m    GENERAL APPEARANCE:  in no distress, thin, somnolent but easily arousable   RESP:  no respiratory distress  CV:  no edema  M/S:   Gait and station abnormal, resting in bed.   SKIN:  Inspection of skin and subcutaneous tissue baseline, Palpation of skin and subcutaneous tissue baseline  NEURO:   Cranial nerves 2-12 are normal tested and grossly at patient's baseline, Examination of sensation by touch normal  PSYCH:  oriented to self, memory impaired       Recent labs in [facility] reviewed by me today.    Most Recent 3 CBC's:  Recent Labs   Lab Test 10/18/23  1050   WBC 7.2   HGB 12.8*   MCV 91        Most Recent 3 BMP's:  Recent Labs   Lab Test 10/18/23  1050      POTASSIUM 4.0   CHLORIDE 94*   CO2 24   BUN 14.9   CR 0.99   ANIONGAP 17*   TI 9.7   *     Lab Results   Component Value Date    A1C 6.9 " 10/18/2023             Assessment/Plan:    (E44.0) Moderate protein-calorie malnutrition (H24)  Comment: Chronic malnutrition. Body mass index is 22.33 kg/m . Episodes of diarrhea resolved. C diff 10/23/23 negative.   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 Diagnosed with Alzheimer's dementia in 04/2018. Impaired mobility, unable to assess gait today. Hx recurrent falls, last fall on 10/8/23 without injuries.   Plan:   - Continue seroquel 25mg BID PRN  - Monitor changes in mood or behaviors  - Continue supportive services at [facility] memory care unit  - Yaneli (daughter) meeting with [facility] today for informational session    (E11.9) Type 2 diabetes mellitus without complication, without long-term current use of insulin (H)  Comment: A1c controlled  Plan:   - Continue metformin, consider discontinue if appetite decreases/becomes variable      Orders:  - Add metamucil 58.6% packet, take 1 packet every day dx diarrhea  - Change imodium to 2mg QID PRN dx diarrhea        Electronically signed by: ROM Singer CNP

## 2023-10-24 NOTE — LETTER
"    10/24/2023        RE: Moustapha Gonzales  C/o Katelyn Covarrubias  [address] [place] [address]        [facility]ATR[facility]    Chief Complaint   Patient presents with     RECHECK     HPI:  Moustapha Gonzales is a 87 year old  (1935), who is being seen today for an episodic care visit at: [facility])([facility]) [[id number]].       Today's concern is:     RN reports patient's stools have been more formed compared to multiple episodes of diarrhea over the past 2 weeks. Recent stool sample collected negative for C diff. Afebrile. States Yaneli (daughter) and family meeting with [facility] team today for informational meeting.    During exam, patient seen resting in bed. HPI limited due to dementia, somnolence. No pain associated behaviors. Per RN staff, patient had an active morning and ate breakfast/lunch prior to taking a nap following lunch. Reports it is not uncommon for patient to take an afternoon nap.      Allergies, and PMH/PSH reviewed in [facility] today.    REVIEW OF SYSTEMS:  Unobtainable secondary to cognitive impairment.       Objective:   BP (!) 147/54   Pulse 63   Temp 97.4  F (36.3  C)   Resp 16   Ht 1.651 m (5' 5\")   Wt 60.9 kg (134 lb 3.2 oz)   SpO2 97%   BMI 22.33 kg/m    GENERAL APPEARANCE:  in no distress, thin, somnolent but easily arousable   RESP:  no respiratory distress  CV:  no edema  M/S:   Gait and station abnormal, resting in bed.   SKIN:  Inspection of skin and subcutaneous tissue baseline, Palpation of skin and subcutaneous tissue baseline  NEURO:   Cranial nerves 2-12 are normal tested and grossly at patient's baseline, Examination of sensation by touch normal  PSYCH:  oriented to self, memory impaired       Recent labs in [facility] reviewed by me today.    Most Recent 3 CBC's:  Recent Labs   Lab Test 10/18/23  1050   WBC 7.2   HGB 12.8*   MCV 91        Most Recent 3 BMP's:  Recent Labs   Lab Test 10/18/23  1050      POTASSIUM 4.0   CHLORIDE 94*   CO2 24   BUN " 14.9   CR 0.99   ANIONGAP 17*   TI 9.7   *     Lab Results   Component Value Date    A1C 6.9 10/18/2023             Assessment/Plan:    (E44.0) Moderate protein-calorie malnutrition (H24)  Comment: Chronic malnutrition. Body mass index is 22.33 kg/m . Episodes of diarrhea resolved. C diff 10/23/23 negative.   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 Diagnosed with Alzheimer's dementia in 2018. Impaired mobility, unable to assess gait today. Hx recurrent falls, last fall on 10/8/23 without injuries.   Plan:   - Continue seroquel 25mg BID PRN  - Monitor changes in mood or behaviors  - Continue supportive services at [facility] memory care unit  - Yaneli (daughter) meeting with [facility] today for informational session    (E11.9) Type 2 diabetes mellitus without complication, without long-term current use of insulin (H)  Comment: A1c controlled  Plan:   - Continue metformin, consider discontinue if appetite decreases/becomes variable      Orders:  - Add metamucil 58.6% packet, take 1 packet every day dx diarrhea  - Change imodium to 2mg QID PRN dx diarrhea        Electronically signed by: ROM Singer CNP              [facility]   2023     Name: Moustapha Gonzales   : 1935       Orders:  - Add metamucil 58.6% packet, take 1 packet every day dx diarrhea  - Change imodium to 2mg QID PRN dx diarrhea        Electronically signed by   ROM Singer CNP on 10/24/2023 at 4:52 PM          Sincerely,        ROM Singer CNP

## 2023-10-24 NOTE — PROGRESS NOTES
[facility]   2023     Name: Moustapha Gonzales   : 1935       Orders:  - Add metamucil 58.6% packet, take 1 packet every day dx diarrhea  - Change imodium to 2mg QID PRN dx diarrhea        Electronically signed by   ROM Singer CNP on 10/24/2023 at 4:52 PM

## 2023-11-09 NOTE — LETTER
"    11/9/2023        RE: Moustapha Gonzales  C/o Katelyn Covarrubias  [address] [place] [address]        [facility]ATR[facility]    Chief Complaint   Patient presents with    RECHECK     HPI:  Moustapha Gonzales is a 87 year old  (1935), who is being seen today for an episodic care visit at: [facility]) [[id number]].       Today's concern is:     Hospice RN requested provider visit today. Reports patient's status has been variable, noted to have nightly sweats and chills for the past few days as well as increased daytime somnolence. Afebrile. Also noted to have recurrent falls with increase in agitation/restlessness and started scheduled ativan 0.5mg q6hrs as well as ordered UA/UC. Now has a broda chair which should help decrease falls. Requires increased assistance with ADLs.     During exam, patient seen sitting in broda chair with hospice RN present. HPI limited due to dementia. Initially was somnolent, then towards after a few minutes patient was completely alert and wanted to find food. Per staff, patient didn't eat breakfast and lunch yet today. Unable to verbalize if he has pain, no pain associated behaviors.       Allergies, and PMH/PSH reviewed in [facility] today.    REVIEW OF SYSTEMS:  Unobtainable secondary to cognitive impairment.       Objective:   BP (!) 143/82   Pulse 77   Temp 97.3  F (36.3  C)   Resp 19   Ht 1.651 m (5' 5\")   Wt 60.9 kg (134 lb 3.2 oz)   SpO2 96%   BMI 22.33 kg/m    GENERAL APPEARANCE:  in no distress, thin, somnolent but easily arousable   RESP:  Poor respiratory effort, diminished LS throughout, no respiratory distress  CV:  RRR, no edema  M/S:   Gait and station abnormal, sitting in broda chair.   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 Diagnosed with Alzheimer's dementia in 04/2018. Recurrent falls, primarily wheelchair bound. Hospice care patient.   Plan:   - Continue comfort meds  - Monitor BM  - Continue supportive service at [facility] memory care unit  - [place] hospice team following. Recommending to continue current treatment plan. If constipation associated with behaviors, add scheduled stool softener. Recommending to assess equipment needs to assist with decreasing frequency of falls.        Electronically signed by: ROM Singer CNP           Sincerely,        ROM Singer CNP

## 2023-11-09 NOTE — PROGRESS NOTES
"[facility] GERIATRICS    Chief Complaint   Patient presents with    RECHECK     HPI:  Moustapha Gonzales is a 87 year old  (1935), who is being seen today for an episodic care visit at: [facility]) [[id number]].       Today's concern is:     Hospice RN requested provider visit today. Reports patient's status has been variable, noted to have nightly sweats and chills for the past few days as well as increased daytime somnolence. Afebrile. Also noted to have recurrent falls with increase in agitation/restlessness and started scheduled ativan 0.5mg q6hrs as well as ordered UA/UC. Now has a broda chair which should help decrease falls. Requires increased assistance with ADLs.     During exam, patient seen sitting in broda chair with hospice RN present. HPI limited due to dementia. Initially was somnolent, then towards after a few minutes patient was completely alert and wanted to find food. Per staff, patient didn't eat breakfast and lunch yet today. Unable to verbalize if he has pain, no pain associated behaviors.       Allergies, and PMH/PSH reviewed in [facility] today.    REVIEW OF SYSTEMS:  Unobtainable secondary to cognitive impairment.       Objective:   BP (!) 143/82   Pulse 77   Temp 97.3  F (36.3  C)   Resp 19   Ht 1.651 m (5' 5\")   Wt 60.9 kg (134 lb 3.2 oz)   SpO2 96%   BMI 22.33 kg/m    GENERAL APPEARANCE:  in no distress, thin, somnolent but easily arousable   RESP:  Poor respiratory effort, diminished LS throughout, no respiratory distress  CV:  RRR, no edema  M/S:   Gait and station abnormal, sitting in broda chair.   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 Diagnosed with Alzheimer's dementia in 04/2018. Recurrent falls, primarily wheelchair bound. Hospice care patient.   Plan:   - Continue comfort meds  - Monitor BM  - Continue supportive service at [facility] memory care unit  - [place] hospice team following. Recommending to continue current treatment plan. If constipation associated with behaviors, add scheduled stool softener. Recommending to assess equipment needs to assist with decreasing frequency of falls.        Electronically signed by: ROM Singer CNP

## 2023-11-20 NOTE — PROGRESS NOTES
Patient was seen for initial assisted living visit for this provider.    Course reviewed with nurse practitioner and nursing staff      Past medical history:  Dementia, advanced, on hospice  History of gait instability with frequent falls  Diabetes mellitus type 2 on no medications, previously on metformin  Hypertension  Prostate cancer    Patient is currently on [place] hospice    He is unable to contribute to the history secondary to cognitive impairment and sleepiness    Current medications were reviewed by me today    Family and social history reviewed    Exam  Frail-appearing male, sleepy, lying in Broda chair  He briefly alerts to name.  He is oriented to person.  Speech is soft  Lungs clear  CV regular rate and rhythm  Abdomen soft  Extremities without edema  Neuro: Sleepy.  Does not follow commands.      Assessment/plan    Advanced dementia with progressive decline in functional and mental status  Plan: Continue hospice cares    Moderate protein calorie malnutrition with BMI of 22  Plan comfort cares    Diabetes mellitus type 2  Not on treatment in view of ongoing weight loss in setting of advanced dementia, comfort care status    Eugene Acosta MD